# Patient Record
Sex: FEMALE
[De-identification: names, ages, dates, MRNs, and addresses within clinical notes are randomized per-mention and may not be internally consistent; named-entity substitution may affect disease eponyms.]

---

## 2021-04-27 PROBLEM — Z00.00 ENCOUNTER FOR PREVENTIVE HEALTH EXAMINATION: Status: ACTIVE | Noted: 2021-04-27

## 2021-04-29 ENCOUNTER — APPOINTMENT (OUTPATIENT)
Dept: NEUROSURGERY | Facility: CLINIC | Age: 56
End: 2021-04-29
Payer: SELF-PAY

## 2021-04-29 ENCOUNTER — TRANSCRIPTION ENCOUNTER (OUTPATIENT)
Age: 56
End: 2021-04-29

## 2021-04-29 VITALS — WEIGHT: 135 LBS | HEIGHT: 67 IN | BODY MASS INDEX: 21.19 KG/M2

## 2021-04-29 DIAGNOSIS — Z86.018 PERSONAL HISTORY OF OTHER BENIGN NEOPLASM: ICD-10-CM

## 2021-04-29 DIAGNOSIS — Z78.9 OTHER SPECIFIED HEALTH STATUS: ICD-10-CM

## 2021-04-29 DIAGNOSIS — H93.A9 PULSATILE TINNITUS, UNSPECIFIED EAR: ICD-10-CM

## 2021-04-29 PROCEDURE — ZZZZZ: CPT

## 2021-04-29 PROCEDURE — 99203 OFFICE O/P NEW LOW 30 MIN: CPT | Mod: 95

## 2021-04-29 RX ORDER — ASPIRIN 81 MG
81 TABLET, DELAYED RELEASE (ENTERIC COATED) ORAL
Refills: 0 | Status: ACTIVE | COMMUNITY

## 2021-04-29 NOTE — PHYSICAL EXAM
[General Appearance - Alert] : alert [General Appearance - In No Acute Distress] : in no acute distress [General Appearance - Well-Appearing] : healthy appearing [Oriented To Time, Place, And Person] : oriented to person, place, and time [Person] : oriented to person [Place] : oriented to place [Time] : oriented to time

## 2021-05-04 NOTE — REVIEW OF SYSTEMS
[As Noted in HPI] : as noted in HPI [Negative] : Heme/Lymph [FreeTextEntry4] : Left Pulsatile Tinnitus

## 2021-05-04 NOTE — HISTORY OF PRESENT ILLNESS
[de-identified] : Ms. OWENS is a pleasant, 55 year old female who presents today with a chief complaint of left ear pulsatile tinnitus.  It first started in 9/2019.  In 6/2020, she had aggressive cervical manipulation with a chiropractor and she started to have symptoms of vertigo and being off balance.  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  She was started on Eliquis and had hypercoagulable work up which was negative as per patient.  Stopped Eliquis since MArch 2021.\par \par She had a follow up MRV last month. \par \par It was recommended that she undergo angiogram for further work up and to r/o dAVF.  \par \par Currently, the left pulsatile tinnitus is an intermittent (but daily) whooshing sound that is in sync with her pulse.  It has been getting progressively more frequent with time.  Pressing on the left side of her neck and turning her head to the left side improve the sound. Bending over makes it worse.  She is finding it very difficult to sleep.  She has been seen by ENT and had a normal hearing test as per patient. \par \par How much is pulsatile tinnitus affecting your quality of life (0-10, 10 worst)? 5\par \par She denies any headaches, blurry vision or diplopia.

## 2021-05-04 NOTE — ASSESSMENT
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 This includes stenting of the sigmoid sinus and the jugular bulb and stent-assisted embolization of the left jugular bulb diverticulum.\par \par We discussed with the patient my extensive personal experience with this treatment and the results of a recent clinical trial. We discussed the procedure that has two components. The first part consists of catheter angiogram and manometry to assess the degree of stenosis and confirm the presence of the venous aneurysm. This part is typically performed with the patient awake. The second part consists of embolization of the venous aneurysm utilizing stent, coils or both and is performed under general anesthesia.\par \par The risks, benefits and alternatives of the procedure were discussed with the patient in detail. In my personal experience, the risks are very rare, but the possibility is not zero. Risks include stroke, brain hemorrhage, any type of disability (facial or extremity weakness, facial or extremity numbness, speech difficulties, blindness) and death. There are also possible complications related to the incisions such as infection, pain, swelling and bleeding.\par \par The patient is aware that the procedure requires dual antiplatelet therapy for 1-month post-stent (typically aspirin 325 mg daily and clopidogrel 75 mg daily) and antiplatelet monotherapy (typically aspirin 325 mg daily) for additional 11 months post-stent.\par \par PLAN\par Follow-up to discuss the possibility of endovascular treatment

## 2021-05-04 NOTE — REASON FOR VISIT
[Home] : at home, [unfilled] , at the time of the visit. [Medical Office: ([facility])___] : at the medical office located in  [Verbal consent obtained from patient] : the patient, [unfilled] [New Patient Visit] : a new patient visit [FreeTextEntry1] : Pulsatile Tinnitus

## 2021-05-06 ENCOUNTER — TRANSCRIPTION ENCOUNTER (OUTPATIENT)
Age: 56
End: 2021-05-06